# Patient Record
Sex: MALE | URBAN - METROPOLITAN AREA
[De-identification: names, ages, dates, MRNs, and addresses within clinical notes are randomized per-mention and may not be internally consistent; named-entity substitution may affect disease eponyms.]

---

## 2021-01-09 ENCOUNTER — OFFICE VISIT (OUTPATIENT)
Dept: URGENT CARE | Facility: CLINIC | Age: 46
End: 2021-01-09
Payer: COMMERCIAL

## 2021-01-09 VITALS
HEART RATE: 73 BPM | OXYGEN SATURATION: 100 % | WEIGHT: 228 LBS | SYSTOLIC BLOOD PRESSURE: 130 MMHG | TEMPERATURE: 97.2 F | DIASTOLIC BLOOD PRESSURE: 88 MMHG | RESPIRATION RATE: 16 BRPM

## 2021-01-09 DIAGNOSIS — K11.1 ENLARGED PAROTID GLAND: Primary | ICD-10-CM

## 2021-01-09 PROCEDURE — 99213 OFFICE O/P EST LOW 20 MIN: CPT | Performed by: PHYSICIAN ASSISTANT

## 2021-01-09 RX ORDER — CHOLECALCIFEROL (VITAMIN D3) 1250 MCG
1 CAPSULE ORAL WEEKLY
COMMUNITY
Start: 2020-12-09

## 2021-01-09 RX ORDER — ATORVASTATIN CALCIUM 10 MG/1
10 TABLET, FILM COATED ORAL
COMMUNITY
Start: 2020-12-09

## 2021-01-09 RX ORDER — NAPROXEN 500 MG/1
500 TABLET ORAL 2 TIMES DAILY WITH MEALS
Qty: 20 TABLET | Refills: 0 | Status: SHIPPED | OUTPATIENT
Start: 2021-01-09 | End: 2021-01-19

## 2021-01-09 NOTE — PATIENT INSTRUCTIONS
Enlarged parotid gland  rx naproxen for pain  Suck on sour candies or victoria to stimulate gland  rec f/u with ENT possible stones since has happened before  Follow up with PCP in 3-5 days  Proceed to  ER if symptoms worsen  Parotid Duct Obstruction   WHAT YOU NEED TO KNOW:   A parotid duct obstruction (PDO) is when your parotid gland is blocked  Your parotid glands are found in your cheeks, over your jaw and in front of your ears  They release saliva into your mouth through the parotid duct  Saliva helps break down food and protect your teeth  DISCHARGE INSTRUCTIONS:   Medicines: You may need any of the following:  · Antibiotics  are used to treat an infection caused by bacteria  · NSAIDs  help decrease swelling and pain or fever  This medicine is available with or without a doctor's order  NSAIDs can cause stomach bleeding or kidney problems in certain people  If you take blood thinner medicine, always ask your healthcare provider if NSAIDs are safe for you  Always read the medicine label and follow directions  · Take your medicine as directed  Contact your healthcare provider if you think your medicine is not helping or if you have side effects  Tell him or her if you are allergic to any medicine  Keep a list of the medicines, vitamins, and herbs you take  Include the amounts, and when and why you take them  Bring the list or the pill bottles to follow-up visits  Carry your medicine list with you in case of an emergency  Manage your symptoms:   · Keep your mouth moist   Suck on hard or sour candy to get your saliva to flow  · Massage the area of your swollen gland  This may help relieve swelling and pain  · Apply heat  on your swollen gland for 20 to 30 minutes every 2 hours for as many days as directed  Heat helps decrease pain and swelling  Prevent another blockage:   · Drink liquids as directed    Ask your healthcare provider how much liquid to drink each day and which liquids are best for you  · Brush and floss your teeth  Good dental hygiene may prevent obstruction and infection  Follow up with your healthcare provider or ENT specialist as directed:  Write down your questions so you remember to ask them during your visits  Contact your healthcare provider or ENT specialist if:   · You have a fever  · The skin over your parotid gland is red and warm  · Your pain and swelling do not go away, or they get worse  · Both sides of your face are swollen  · Your mouth and eyes are very dry  · You have questions or concerns about your condition or care  Return to the emergency department if:   · You have severe pain  · You cannot move part of your face  © Copyright [address] Information is for End User's use only and may not be sold, redistributed or otherwise used for commercial purposes  All illustrations and images included in CareNotes® are the copyrighted property of [employer]  or Eli Cordero  The above information is an  only  It is not intended as medical advice for individual conditions or treatments  Talk to your doctor, nurse or pharmacist before following any medical regimen to see if it is safe and effective for you

## 2021-01-09 NOTE — PROGRESS NOTES
[facility]        NAME: Virgen Rooney is a 39 y o  male  : 1975    MRN: [id number]  DATE: 2021  TIME: 11:12 AM    Assessment and Plan   Enlarged parotid gland [K11 1]  1  Enlarged parotid gland       Patient Instructions   Enlarged parotid gland  rx naproxen for pain  Suck on sour candies or victoria to stimulate gland  rec f/u with ENT possible stones since has happened before  Follow up with PCP in 3-5 days  Proceed to  ER if symptoms worsen  Chief Complaint     Chief Complaint   Patient presents with    Facial Swelling     pt presents with left sided facial/cheek / neck swelling/ external ear tender to touch; started yesterday         History of Present Illness       Kashif Mann is a 26-year-old male who presents to clinic complaining of left external the ear pain along with pre auricular pain on the left side times it 1 day  He denies any internal ear pain  He states that he is also having left facial swelling and pain somewhat so that he cannot open his mouth fully  He has increased pain with swallowing and chewing  He denies any nasal congestion, fever, chills, cough, shortness of breath, fatigue, myalgias,   recent travel, or exposure to anyone known COVID positive  Review of Systems   Review of Systems   Constitutional: Negative for chills and fever  HENT: Positive for congestion, sore throat and trouble swallowing  Negative for sinus pressure and sinus pain  See HPI   Respiratory: Negative for cough and shortness of breath  Neurological: Positive for headaches       Current Medications       Current Outpatient Medications:     atorvastatin (LIPITOR) 10 mg tablet, Take 10 mg by mouth daily at bedtime, Disp: , Rfl:     Cholecalciferol (Vitamin D3) 1 25 MG (41755 UT) CAPS, Take 1 capsule by mouth once a week, Disp: , Rfl:     Current Allergies     Allergies as of 2021    (No Known Allergies)            The following portions of the patient's history were reviewed and updated as appropriate: allergies, current medications, past family history, past medical history, past social history, past surgical history and problem list      Past Medical History:   Diagnosis Date    Hyperlipidemia        Past Surgical History:   Procedure Laterality Date    NO PAST SURGERIES         History reviewed  No pertinent family history  Medications have been verified  Objective   /88   Pulse 73   Temp (!) 97 2 °F (36 2 °C)   Resp 16   Wt 103 kg (228 lb)   SpO2 100%   No LMP for male patient  Physical Exam     Physical Exam  Vitals signs and nursing note reviewed  Constitutional:       General: He is not in acute distress  Appearance: Normal appearance  He is not ill-appearing  HENT:      Head:      Jaw: Tenderness, swelling and pain on movement present  Cardiovascular:      Rate and Rhythm: Normal rate and regular rhythm  Heart sounds: Normal heart sounds  Pulmonary:      Effort: Pulmonary effort is normal       Breath sounds: Normal breath sounds  Lymphadenopathy:      Head:      Left side of head: Preauricular adenopathy present  Neurological:      Mental Status: He is alert and oriented to person, place, and time     Psychiatric:         Mood and Affect: Mood normal          Behavior: Behavior normal